# Patient Record
Sex: MALE | Race: WHITE | HISPANIC OR LATINO | Employment: STUDENT | URBAN - METROPOLITAN AREA
[De-identification: names, ages, dates, MRNs, and addresses within clinical notes are randomized per-mention and may not be internally consistent; named-entity substitution may affect disease eponyms.]

---
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
## 2018-01-11 NOTE — MISCELLANEOUS
Provider Comments  Provider Comments:   patient no showed for nurse visit  lm on machine to call and reschedule      Signatures   Electronically signed by : Flaco Carrillo, ; Jul 21 2016  4:21PM EST                       (Author)    Electronically signed by : Dorean Castleman, DO; Jul 22 2016  9:23AM EST                       (Acknowledgement)

## 2018-01-13 VITALS
RESPIRATION RATE: 20 BRPM | DIASTOLIC BLOOD PRESSURE: 70 MMHG | SYSTOLIC BLOOD PRESSURE: 100 MMHG | HEIGHT: 63 IN | BODY MASS INDEX: 28 KG/M2 | OXYGEN SATURATION: 98 % | WEIGHT: 158 LBS | TEMPERATURE: 97.3 F | HEART RATE: 97 BPM

## 2018-01-16 NOTE — RESULT NOTES
Message  April 14, 2017  Time: 12:15 PM  Telephone: [phone]    Mother notified of the following normal laboratory results from April 10, 2017:  CBC: Hemoglobin 15 4, hematocrit 45 1, WBC 6900, with 70 polys, 20 lymphs, 8 monocytes, one eosinophil, and one basophil  Platelets normal at 907,666  PT normal at 10 9  INR normal at 1 0  Activated PTT normal at 30    BUN 20, creatinine 0 89, sodium 140, potassium 4 2, chloride 105, CO2 30, calcium 9 4, glucose 86  Impression: Normal laboratory evaluation  Cleared for surgery    [place] [facility] [place] and will let the Surgeon know the labs are available for review in the Physician Portal   CB Deb MCFADDEN  Signatures   Electronically signed by : Kassandra Hinojosa DO;  Apr 14 2017 12:25PM EST                       (Author)

## 2018-09-05 ENCOUNTER — OFFICE VISIT (OUTPATIENT)
Dept: DERMATOLOGY | Facility: CLINIC | Age: 18
End: 2018-09-05
Payer: COMMERCIAL

## 2018-09-05 DIAGNOSIS — D23.9 ADNEXAL TUMOR: Primary | ICD-10-CM

## 2018-09-05 DIAGNOSIS — L85.8 KERATOSIS PILARIS: ICD-10-CM

## 2018-09-05 PROCEDURE — 88305 TISSUE EXAM BY PATHOLOGIST: CPT | Performed by: PATHOLOGY

## 2018-09-05 PROCEDURE — 99203 OFFICE O/P NEW LOW 30 MIN: CPT | Performed by: DERMATOLOGY

## 2018-09-05 PROCEDURE — 11306 SHAVE SKIN LESION 0.6-1.0 CM: CPT | Performed by: DERMATOLOGY

## 2018-09-05 RX ORDER — LEVOTHYROXINE SODIUM 0.12 MG/1
0.5 TABLET ORAL DAILY
COMMUNITY
Start: 2014-06-23

## 2018-09-05 NOTE — PROGRESS NOTES
[facility] DERMATOLOGY  [address] [place] [address]  [phone]  [phone]     MRN: [id number] : 2000  Encounter: [id number]  Patient Information: James Amaral  Chief complaint: skin growth and rash    History of present illness: 43-year-old male with history of Down syndrome presents for overall skin check concerned regarding a growth on his scalp that has been a problem in slowly been getting larger was noted several months ago also concerned regarding a rash that is extensive on his arms back  and legs  No past medical history on file  No past surgical history on file  Social History   History   Alcohol Use No     History   Drug Use No     History   Smoking Status    Never Smoker   Smokeless Tobacco    Never Used     No family history on file  Meds/Allergies   No Known Allergies    Meds:  Prior to Admission medications    Medication Sig Start Date End Date Taking?  Authorizing Provider   levothyroxine 125 mcg tablet Take 0 5 tablets by mouth daily 14  Yes Historical Provider, MD   Multiple Vitamins-Minerals (MULTIVITAMIN GUMMIES ADULT PO) Take 1 tablet by mouth   Yes Historical Provider, MD       Subjective:     Review of Systems:    General: negative for - chills, fatigue, fever,  weight gain or weight loss  Psychological: negative for - anxiety, behavioral disorder, concentration difficulties, decreased libido, depression, irritability, memory difficulties, mood swings, sleep disturbances or suicidal ideation  ENT: negative for - hearing difficulties , nasal congestion, nasal discharge, oral lesions, sinus pain, sneezing, sore throat  Allergy and Immunology: negative for - hives, insect bite sensitivity,  Hematological and Lymphatic: negative for - bleeding problems, blood clots,bruising, swollen lymph nodes  Endocrine: negative for - hair pattern changes, hot flashes, malaise/lethargy, mood swings, palpitations, polydipsia/polyuria, skin changes, temperature intolerance or unexpected weight change  Respiratory: negative for - cough, hemoptysis, orthopnea, shortness of breath, or wheezing  Cardiovascular: negative for - chest pain, dyspnea on exertion, edema,  Gastrointestinal: negative for - abdominal pain, nausea/vomiting  Genito-Urinary: negative for - dysuria, incontinence, irregular/heavy menses or urinary frequency/urgency  Musculoskeletal: negative for - gait disturbance, joint pain, joint stiffness, joint swelling, muscle pain, muscular weakness  Dermatological:  As in HPI  Neurological: negative for confusion, dizziness, headaches, impaired coordination/balance, memory loss, numbness/tingling, seizures, speech problems, tremors or weakness       Objective: There were no vitals taken for this visit  Physical Exam:    General Appearance:    Alert, cooperative, no distress   Head:    Normocephalic, without obvious abnormality, atraumatic           Skin:   A full skin exam was performed including scalp, head scalp, eyes, ears, nose, lips, neck, chest, axilla, abdomen, back, buttocks, bilateral upper extremities, bilateral lower extremities, hands, feet, fingers, toes, fingernails, and toenails  Fleshy 6 mm nodule noted on the occiput of the scalp follicular keratotic papules widespread areas on the dorsum of the arms thighs and back  Shave excision Procedure Note    Pre-operative Diagnosis:  Adnexal tumor    Plan:  1  Instructed to keep the wound dry and covered for 24 and clean thereafter  2  Warning signs of infection were reviewed  3  Recommended that the patient use OTC acetaminophen as needed for pain  Locations:    Indications:  Growing lesion     Anesthesia: Lidocaine 1% without epinephrine without added sodium bicarbonate    Procedure Details     Patient informed of the risks (including bleeding and infection) and benefits of the   procedure and Verbal informed consent obtained      The lesion and surrounding area were prepped using alcohol  A Blue blade razor was used to remove the lesion  Hemostasis achieved with aluminum chloride  Petrolatum and a sterile dressing applied  The specimen was sent for pathologic examination  The patient tolerated the procedure(s) well  Complications:  none  Assessment:     1  Adnexal tumor     2  Keratosis pilaris           Plan:    adnexal tumor await the results of biopsy if further treatment is indicated and also to make 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

## 2018-09-05 NOTE — PATIENT INSTRUCTIONS
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

## 2018-09-25 ENCOUNTER — TELEPHONE (OUTPATIENT)
Dept: DERMATOLOGY | Facility: CLINIC | Age: 18
End: 2018-09-25

## 2018-09-25 NOTE — TELEPHONE ENCOUNTER
----- Message from Tila Salter MD sent at 9/16/2018  1:11 PM EDT -----  Please call him of normal pathology

## 2018-09-26 ENCOUNTER — TELEPHONE (OUTPATIENT)
Dept: DERMATOLOGY | Facility: CLINIC | Age: 18
End: 2018-09-26

## 2018-09-26 NOTE — TELEPHONE ENCOUNTER
----- Message from Christelle Ma MD sent at 9/16/2018  1:11 PM EDT -----  Please call him of normal pathology